# Patient Record
Sex: MALE | Race: OTHER | NOT HISPANIC OR LATINO | URBAN - METROPOLITAN AREA
[De-identification: names, ages, dates, MRNs, and addresses within clinical notes are randomized per-mention and may not be internally consistent; named-entity substitution may affect disease eponyms.]

---

## 2018-06-22 ENCOUNTER — APPOINTMENT (RX ONLY)
Dept: URBAN - METROPOLITAN AREA CLINIC 47 | Facility: CLINIC | Age: 53
Setting detail: DERMATOLOGY
End: 2018-06-22

## 2018-06-22 DIAGNOSIS — Z41.9 ENCOUNTER FOR PROCEDURE FOR PURPOSES OTHER THAN REMEDYING HEALTH STATE, UNSPECIFIED: ICD-10-CM

## 2018-06-22 PROCEDURE — ? LASER HAIR REMOVAL

## 2018-06-22 ASSESSMENT — LOCATION ZONE DERM: LOCATION ZONE: EAR

## 2018-06-22 ASSESSMENT — LOCATION SIMPLE DESCRIPTION DERM
LOCATION SIMPLE: LEFT EAR
LOCATION SIMPLE: RIGHT EAR

## 2018-06-22 ASSESSMENT — LOCATION DETAILED DESCRIPTION DERM
LOCATION DETAILED: LEFT SUPERIOR CRUS OF ANTIHELIX
LOCATION DETAILED: RIGHT SUPERIOR CRUS OF ANTIHELIX

## 2018-06-22 NOTE — PROCEDURE: LASER HAIR REMOVAL
Number Of Prepaid Treatments (Will Not Render If 0): 0
Post-Care Instructions: I reviewed with the patient in detail post-care instructions. Patient should avoid sun for a minimum of 4 weeks before and after treatment.
Fluence (Will Not Render If 0): [id number]
Fluence (Will Not Render If 0): [address]
Pulse Duration: 30 ms
Location Override: buttocks
Total Pulses: Double pass
Fluence (Will Not Render If 0): 5
Total Pulses: single pulse
Laser Type: diode 810nm
Fluence (Will Not Render If 0): 15
Spot Size: Israel Mariano
Cooling: chill tip
Render Post-Care In The Note: No
Pulse Duration: 50 ms
Pulse Duration: auto
Total Pulses: [address]
Detail Level: Zone
Treatment Number: 1
Consent: Written consent obtained, risks reviewed including but not limited to crusting, scabbing, blistering, scarring, darker or lighter pigmentary change, paradoxical hair regrowth, incomplete removal of hair and infection.
Post-Procedure Care: Immediate endpoint: perifollicular erythema and edema. Hydrocortisone cream 2.5% applied. Post care reviewed with patient.
Tolerated Procedure (Optional): Tolerated Well
Pre-Procedure: Prior to proceeding the treatment areas were cleaned and all present put on their eye protection.
Fluence (Will Not Render If 0): 28
Fluence (Will Not Render If 0): 30
Total Pulses (Settings #4): 90
Location Override: ears
Spot Size: Small [employer]
Fluence (Will Not Render If 0): [address] [address]

## 2018-07-26 ENCOUNTER — APPOINTMENT (RX ONLY)
Dept: URBAN - METROPOLITAN AREA CLINIC 47 | Facility: CLINIC | Age: 53
Setting detail: DERMATOLOGY
End: 2018-07-26

## 2018-07-26 DIAGNOSIS — Z41.9 ENCOUNTER FOR PROCEDURE FOR PURPOSES OTHER THAN REMEDYING HEALTH STATE, UNSPECIFIED: ICD-10-CM

## 2018-07-26 PROCEDURE — ? LASER HAIR REMOVAL

## 2018-07-26 ASSESSMENT — LOCATION DETAILED DESCRIPTION DERM
LOCATION DETAILED: LEFT INFERIOR MEDIAL LOWER BACK
LOCATION DETAILED: RIGHT BUTTOCK

## 2018-07-26 ASSESSMENT — LOCATION SIMPLE DESCRIPTION DERM
LOCATION SIMPLE: LEFT LOWER BACK
LOCATION SIMPLE: RIGHT BUTTOCK

## 2018-07-26 ASSESSMENT — LOCATION ZONE DERM: LOCATION ZONE: TRUNK

## 2018-07-26 NOTE — PROCEDURE: LASER HAIR REMOVAL
Pre-Procedure: Prior to proceeding the treatment areas were cleaned and all present put on their eye protection.
Render Post-Care In The Note: No
Pulse Duration: 50 ms
Pulse Duration: auto
Fluence (Will Not Render If 0): 5
Consent: Written consent obtained, risks reviewed including but not limited to crusting, scabbing, blistering, scarring, darker or lighter pigmentary change, paradoxical hair regrowth, incomplete removal of hair and infection.
Number Of Prepaid Treatments (Will Not Render If 0): 0
Total Pulses: [address]
Spot Size: Israel Mariano
Tolerated Procedure (Optional): Tolerated Well
Fluence (Will Not Render If 0): [address]
Post-Procedure Care: Immediate endpoint: perifollicular erythema and edema. Hydrocortisone cream 2.5% applied. Post care reviewed with patient.
Fluence (Will Not Render If 0): 15
Pulse Duration: 30 ms
Fluence (Will Not Render If 0): [address]
Laser Type: diode 810nm
Treatment Number: 2
Cooling: chill tip
Total Pulses: Double pass
Post-Care Instructions: I reviewed with the patient in detail post-care instructions. Patient should avoid sun for a minimum of 4 weeks before and after treatment.
Total Pulses: double pulse
Detail Level: Zone
Total Pulses (Settings #4): 90
Fluence (Will Not Render If 0): 30
Location Override: Ears
Spot Size: Small [employer]
Total Pulses: single pulse
Fluence (Will Not Render If 0): [address] [address]
Fluence (Will Not Render If 0): 28

## 2018-07-26 NOTE — HPI: COSMETIC (LASER HAIR REMOVAL)
Have You Had Laser Hair Removal Before?: has had previous treatment
When Was Your Last Laser Treatment?: 6/22/2018
Number Of Treatments: 1

## 2018-09-06 ENCOUNTER — APPOINTMENT (RX ONLY)
Dept: URBAN - METROPOLITAN AREA CLINIC 47 | Facility: CLINIC | Age: 53
Setting detail: DERMATOLOGY
End: 2018-09-06

## 2018-09-06 DIAGNOSIS — Z41.9 ENCOUNTER FOR PROCEDURE FOR PURPOSES OTHER THAN REMEDYING HEALTH STATE, UNSPECIFIED: ICD-10-CM

## 2018-09-06 PROCEDURE — ? LASER HAIR REMOVAL

## 2018-09-06 NOTE — HPI: COSMETIC (LASER HAIR REMOVAL)
Have You Had Laser Hair Removal Before?: has had previous treatment
When Was Your Last Laser Treatment?: 7/26/2018
Number Of Treatments: 2

## 2018-09-06 NOTE — PROCEDURE: LASER HAIR REMOVAL
Detail Level: Zone
Number Of Prepaid Treatments (Will Not Render If 0): 0
Treatment Number: 3
Post-Care Instructions: I reviewed with the patient in detail post-care instructions. Patient should avoid sun for a minimum of 4 weeks before and after treatment.
Consent: Written consent obtained, risks reviewed including but not limited to crusting, scabbing, blistering, scarring, darker or lighter pigmentary change, paradoxical hair regrowth, incomplete removal of hair and infection.
Total Pulses: single pulse
Tolerated Procedure (Optional): Tolerated Well
Pulse Duration: auto
Total Pulses: [address]
Laser Type: diode 810nm
Pulse Duration: 30 ms
Render Post-Care In The Note: No
Pulse Duration: 50 ms
Fluence (Will Not Render If 0): [address] [address]
Spot Size: Israel Mariano
Fluence (Will Not Render If 0): [address]
Total Pulses: Double pass
Pre-Procedure: Prior to proceeding the treatment areas were cleaned and all present put on their eye protection.
Fluence (Will Not Render If 0): 6
Cooling: chill tip
Location Override: ears
Fluence (Will Not Render If 0): 15
Post-Procedure Care: Immediate endpoint: perifollicular erythema and edema. Hydrocortisone cream 2.5% applied. Post care reviewed with patient.

## 2018-10-11 ENCOUNTER — APPOINTMENT (RX ONLY)
Dept: URBAN - METROPOLITAN AREA CLINIC 47 | Facility: CLINIC | Age: 53
Setting detail: DERMATOLOGY
End: 2018-10-11

## 2018-10-11 DIAGNOSIS — Z41.9 ENCOUNTER FOR PROCEDURE FOR PURPOSES OTHER THAN REMEDYING HEALTH STATE, UNSPECIFIED: ICD-10-CM

## 2018-10-11 PROCEDURE — ? LASER HAIR REMOVAL

## 2018-10-11 NOTE — PROCEDURE: LASER HAIR REMOVAL
Fluence (Will Not Render If 0): 10
Detail Level: Detailed
Number Of Prepaid Treatments (Will Not Render If 0): 0
Total Area (Optional- Include Units): ears
Fluence (Will Not Render If 0): [address] [facility]
Fluence (Will Not Render If 0): 20
Laser Type: Alexandrite 755nm
Shaving (Optional): The patient shaved at home
Comments: 30ms/[id number]
Post-Procedure Care: Immediate endpoint: perifollicular erythema and edema. Hydrocortisone applied. Nati Weir Post care reviewed with patient.
Render Post-Care In The Note: No
Pre-Procedure: Prior to proceeding the treatment areas were cleaned and all present put on their eye protection.
Post-Care Instructions: I reviewed with the patient in detail post-care instructions. Patient should avoid sun for a minimum of 4 weeks before and after treatment.
Consent: Written consent obtained, risks reviewed including but not limited to crusting, scabbing, blistering, scarring, darker or lighter pigmentary change, paradoxical hair regrowth, incomplete removal of hair and infection.

## 2018-12-21 ENCOUNTER — APPOINTMENT (RX ONLY)
Dept: URBAN - METROPOLITAN AREA CLINIC 47 | Facility: CLINIC | Age: 53
Setting detail: DERMATOLOGY
End: 2018-12-21

## 2018-12-21 DIAGNOSIS — Z41.9 ENCOUNTER FOR PROCEDURE FOR PURPOSES OTHER THAN REMEDYING HEALTH STATE, UNSPECIFIED: ICD-10-CM

## 2018-12-21 PROCEDURE — ? LASER HAIR REMOVAL

## 2018-12-21 ASSESSMENT — LOCATION DETAILED DESCRIPTION DERM
LOCATION DETAILED: RIGHT BUTTOCK
LOCATION DETAILED: RIGHT SCAPHA
LOCATION DETAILED: LEFT SCAPHA

## 2018-12-21 ASSESSMENT — LOCATION ZONE DERM
LOCATION ZONE: EAR
LOCATION ZONE: TRUNK

## 2018-12-21 ASSESSMENT — LOCATION SIMPLE DESCRIPTION DERM
LOCATION SIMPLE: RIGHT BUTTOCK
LOCATION SIMPLE: LEFT EAR
LOCATION SIMPLE: RIGHT EAR

## 2018-12-21 NOTE — PROCEDURE: LASER HAIR REMOVAL
Pulse Duration: 30 ms
Total Pulses: single pulse
Fluence (Will Not Render If 0): 6
Treatment Number: 0
Treatment Number: 5
Post-Care Instructions: I reviewed with the patient in detail post-care instructions. Patient should avoid sun for a minimum of 4 weeks before and after treatment.
Total Pulses: Double pass
Laser Type: diode 810nm
Fluence (Will Not Render If 0): [address]
Cooling: chill tip
Tolerated Procedure (Optional): Tolerated Well
Fluence (Will Not Render If 0): [address] [address]
Render Post-Care In The Note: No
Pulse Duration: auto
Fluence (Will Not Render If 0): 15
Detail Level: Zone
Spot Size: Israel Mariano
Pre-Procedure: Prior to proceeding the treatment areas were cleaned and all present put on their eye protection.
Pulse Duration: 50 ms
Consent: Written consent obtained, risks reviewed including but not limited to crusting, scabbing, blistering, scarring, darker or lighter pigmentary change, paradoxical hair regrowth, incomplete removal of hair and infection.
Total Pulses: [address]
Post-Procedure Care: Immediate endpoint: perifollicular erythema and edema. Hydrocortisone cream 2.5% applied. Post care reviewed with patient.
Fluence (Will Not Render If 0): 18
Total Pulses (Settings #4): 90
Spot Size: Small [employer]
Fluence (Will Not Render If 0): 28
Location Override: ears
Fluence (Will Not Render If 0): [address]

## 2018-12-21 NOTE — HPI: COSMETIC (LASER HAIR REMOVAL)
Have You Had Laser Hair Removal Before?: has had previous treatment
When Was Your Last Laser Treatment?: 10/11/2018
Number Of Treatments: 4

## 2019-02-01 ENCOUNTER — APPOINTMENT (RX ONLY)
Dept: URBAN - METROPOLITAN AREA CLINIC 47 | Facility: CLINIC | Age: 54
Setting detail: DERMATOLOGY
End: 2019-02-01

## 2019-02-01 DIAGNOSIS — Z41.9 ENCOUNTER FOR PROCEDURE FOR PURPOSES OTHER THAN REMEDYING HEALTH STATE, UNSPECIFIED: ICD-10-CM

## 2019-02-01 PROCEDURE — ? LASER HAIR REMOVAL

## 2019-02-01 NOTE — HPI: COSMETIC (LASER HAIR REMOVAL)
Have You Had Laser Hair Removal Before?: has had previous treatment
When Was Your Last Laser Treatment?: 12/21/2019
Number Of Treatments: 5

## 2019-02-01 NOTE — PROCEDURE: LASER HAIR REMOVAL
Fluence (Will Not Render If 0): 15
Fluence (Will Not Render If 0): [address]
Render Post-Care In The Note: No
Anesthesia Volume In Cc: 0
Cooling: chill tip
Treatment Number: 6
Fluence (Will Not Render If 0): [address] [address]
Pulse Duration: auto
Pulse Duration: 30 ms
Pulse Duration: 50 ms
Spot Size: Small [employer]
Total Pulses: single pulse
Consent: Written consent obtained, risks reviewed including but not limited to crusting, scabbing, blistering, scarring, darker or lighter pigmentary change, paradoxical hair regrowth, incomplete removal of hair and infection.
Pre-Procedure: Prior to proceeding the treatment areas were cleaned and all present put on their eye protection.
Topical Anesthesia Type: BLT cream (benzocaine 20%, lidocaine 6%, tetracaine 4%)
Laser Type: diode 810nm
Tolerated Procedure (Optional): Tolerated Well
Total Pulses: Double pass
Fluence (Will Not Render If 0): [address] [facility]
Total Pulses: [address]
Post-Care Instructions: I reviewed with the patient in detail post-care instructions. Patient should avoid sun for a minimum of 4 weeks before and after treatment.
Post-Procedure Care: Immediate endpoint: perifollicular erythema and edema. Hydrocortisone cream 2.5% applied. Post care reviewed with patient.
Detail Level: Zone
Location Override: buttocks and ears